# Patient Record
Sex: FEMALE | Race: WHITE | NOT HISPANIC OR LATINO | URBAN - METROPOLITAN AREA
[De-identification: names, ages, dates, MRNs, and addresses within clinical notes are randomized per-mention and may not be internally consistent; named-entity substitution may affect disease eponyms.]

---

## 2020-03-19 ENCOUNTER — EMERGENCY (EMERGENCY)
Facility: HOSPITAL | Age: 65
LOS: 1 days | Discharge: HOME | End: 2020-03-19
Attending: STUDENT IN AN ORGANIZED HEALTH CARE EDUCATION/TRAINING PROGRAM
Payer: COMMERCIAL

## 2020-03-19 VITALS
HEART RATE: 105 BPM | SYSTOLIC BLOOD PRESSURE: 152 MMHG | TEMPERATURE: 99 F | WEIGHT: 184.97 LBS | DIASTOLIC BLOOD PRESSURE: 85 MMHG | HEIGHT: 64 IN | OXYGEN SATURATION: 98 %

## 2020-03-19 VITALS
TEMPERATURE: 99 F | DIASTOLIC BLOOD PRESSURE: 78 MMHG | RESPIRATION RATE: 18 BRPM | HEART RATE: 82 BPM | SYSTOLIC BLOOD PRESSURE: 131 MMHG | OXYGEN SATURATION: 98 %

## 2020-03-19 LAB
ALBUMIN SERPL ELPH-MCNC: 3.7 G/DL — SIGNIFICANT CHANGE UP (ref 3.3–5)
ALP SERPL-CCNC: 105 U/L — SIGNIFICANT CHANGE UP (ref 40–120)
ALT FLD-CCNC: 72 U/L — HIGH (ref 10–45)
ANION GAP SERPL CALC-SCNC: 15 MMOL/L — SIGNIFICANT CHANGE UP (ref 5–17)
AST SERPL-CCNC: 48 U/L — HIGH (ref 10–40)
BASOPHILS # BLD AUTO: 0 K/UL — SIGNIFICANT CHANGE UP (ref 0–0.2)
BASOPHILS NFR BLD AUTO: 0 % — SIGNIFICANT CHANGE UP (ref 0–2)
BILIRUB SERPL-MCNC: 0.3 MG/DL — SIGNIFICANT CHANGE UP (ref 0.2–1.2)
BUN SERPL-MCNC: 12 MG/DL — SIGNIFICANT CHANGE UP (ref 7–23)
CALCIUM SERPL-MCNC: 9.2 MG/DL — SIGNIFICANT CHANGE UP (ref 8.4–10.5)
CHLORIDE SERPL-SCNC: 105 MMOL/L — SIGNIFICANT CHANGE UP (ref 96–108)
CO2 SERPL-SCNC: 22 MMOL/L — SIGNIFICANT CHANGE UP (ref 22–31)
CREAT SERPL-MCNC: 0.57 MG/DL — SIGNIFICANT CHANGE UP (ref 0.5–1.3)
EOSINOPHIL # BLD AUTO: 0.04 K/UL — SIGNIFICANT CHANGE UP (ref 0–0.5)
EOSINOPHIL NFR BLD AUTO: 0.9 % — SIGNIFICANT CHANGE UP (ref 0–6)
GLUCOSE SERPL-MCNC: 108 MG/DL — HIGH (ref 70–99)
HCT VFR BLD CALC: 35.2 % — SIGNIFICANT CHANGE UP (ref 34.5–45)
HGB BLD-MCNC: 10.9 G/DL — LOW (ref 11.5–15.5)
LYMPHOCYTES # BLD AUTO: 0.29 K/UL — LOW (ref 1–3.3)
LYMPHOCYTES # BLD AUTO: 6.9 % — LOW (ref 13–44)
MCHC RBC-ENTMCNC: 29.4 PG — SIGNIFICANT CHANGE UP (ref 27–34)
MCHC RBC-ENTMCNC: 31 GM/DL — LOW (ref 32–36)
MCV RBC AUTO: 94.9 FL — SIGNIFICANT CHANGE UP (ref 80–100)
MONOCYTES # BLD AUTO: 0.29 K/UL — SIGNIFICANT CHANGE UP (ref 0–0.9)
MONOCYTES NFR BLD AUTO: 7 % — SIGNIFICANT CHANGE UP (ref 2–14)
NEUTROPHILS # BLD AUTO: 3.58 K/UL — SIGNIFICANT CHANGE UP (ref 1.8–7.4)
NEUTROPHILS NFR BLD AUTO: 84.3 % — HIGH (ref 43–77)
NT-PROBNP SERPL-SCNC: 104 PG/ML — SIGNIFICANT CHANGE UP (ref 0–300)
PLATELET # BLD AUTO: 208 K/UL — SIGNIFICANT CHANGE UP (ref 150–400)
POTASSIUM SERPL-MCNC: 4.2 MMOL/L — SIGNIFICANT CHANGE UP (ref 3.5–5.3)
POTASSIUM SERPL-SCNC: 4.2 MMOL/L — SIGNIFICANT CHANGE UP (ref 3.5–5.3)
PROT SERPL-MCNC: 7.3 G/DL — SIGNIFICANT CHANGE UP (ref 6–8.3)
RBC # BLD: 3.71 M/UL — LOW (ref 3.8–5.2)
RBC # FLD: 13 % — SIGNIFICANT CHANGE UP (ref 10.3–14.5)
SODIUM SERPL-SCNC: 142 MMOL/L — SIGNIFICANT CHANGE UP (ref 135–145)
TROPONIN T, HIGH SENSITIVITY RESULT: <6 NG/L — SIGNIFICANT CHANGE UP (ref 0–51)
WBC # BLD: 4.2 K/UL — SIGNIFICANT CHANGE UP (ref 3.8–10.5)
WBC # FLD AUTO: 4.2 K/UL — SIGNIFICANT CHANGE UP (ref 3.8–10.5)

## 2020-03-19 PROCEDURE — 71045 X-RAY EXAM CHEST 1 VIEW: CPT | Mod: 26

## 2020-03-19 PROCEDURE — 93005 ELECTROCARDIOGRAM TRACING: CPT

## 2020-03-19 PROCEDURE — 71045 X-RAY EXAM CHEST 1 VIEW: CPT

## 2020-03-19 PROCEDURE — 84484 ASSAY OF TROPONIN QUANT: CPT

## 2020-03-19 PROCEDURE — 83880 ASSAY OF NATRIURETIC PEPTIDE: CPT

## 2020-03-19 PROCEDURE — 93010 ELECTROCARDIOGRAM REPORT: CPT

## 2020-03-19 PROCEDURE — 80053 COMPREHEN METABOLIC PANEL: CPT

## 2020-03-19 PROCEDURE — 99284 EMERGENCY DEPT VISIT MOD MDM: CPT | Mod: 25

## 2020-03-19 PROCEDURE — 99285 EMERGENCY DEPT VISIT HI MDM: CPT

## 2020-03-19 PROCEDURE — 85027 COMPLETE CBC AUTOMATED: CPT

## 2020-03-19 RX ORDER — ACETAMINOPHEN 500 MG
650 TABLET ORAL ONCE
Refills: 0 | Status: COMPLETED | OUTPATIENT
Start: 2020-03-19 | End: 2020-03-19

## 2020-03-19 RX ADMIN — Medication 650 MILLIGRAM(S): at 14:10

## 2020-03-19 RX ADMIN — Medication 650 MILLIGRAM(S): at 13:39

## 2020-03-19 NOTE — ED PROVIDER NOTE - OBJECTIVE STATEMENT
64F w/ HTN p/w SOB w/ exertion. States she was diagnosed w/ flu w/o a swab at urgent care and started on Tamiflu. Went there for a fever and cough. Reports having diarrhea. States she has been fever free for two days.  Denies chest pain, sick contacts, recent travel, n/v/d, abdominal pain.

## 2020-03-19 NOTE — ED PROVIDER NOTE - CLINICAL SUMMARY MEDICAL DECISION MAKING FREE TEXT BOX
64F w/ s/p URI now w/ exertional SOB, will check trops, ekg, bnp, evaluate for viral myocarditis, patient was treated for flu however never had a swab, could be COVID

## 2020-03-19 NOTE — ED PROVIDER NOTE - NSFOLLOWUPINSTRUCTIONS_ED_ALL_ED_FT
YOU WERE SEEN IN THE ED FOR A LIKELY VIRAL ILLNESS. WE CANNOT PERFORM DEFINITIVE TESTING AT THIS TIME FOR THE NOVEL CORONAVIRUS. PLEASE READ THE INFORMATION PACKET PROVIDED TO YOU CAREFULLY.     YOU SHOULD SELF-QUARANTINE FOR 14 DAYS TO AVOID POTENTIAL SPREAD OF THE CORONAVIRUS.     YOU WILL BE CONTACTED BY [facility] OR YOUR LOCAL HEALTH DEPARTMENT AFTER YOUR DISCHARGE TO HELP GUIDE YOU IN YOUR QUARANTINE AND ADVISE YOU FURTHER.    Return to the ED for difficulty breathing.    You may over the counter acetaminophen (Tylenol) 650mg every 6 hours as needed for fever or pain. There is some concern in the medical community about using ibuprofen (Advil, Motrin) and other NSAIDs in people with COVID infections and until there is more research on this subject it may be best to avoid NSAIDs like ibuprofen at the moment unless you have an allergy to acetaminophen (Tylenol).  Do NOT exceed 3500mg acetaminophen in 24 hours.  Please do not take these medications if you do not have pain or fever or if you have any history of liver disease.    -------------    What is a coronavirus?  Coronaviruses are a large family of viruses that cause illnesses ranging from the common cold to more severe diseases such as Middle East Respiratory Syndrome (MERS) and Severe Acute Respiratory Syndrome (SARS).    What is Novel Coronavirus (COVID-19)?  The Centers for Disease Control and Prevention (CDC) is closely monitoring the outbreak caused by COVID-19. For the latest information about COVID-19, visit the CDC website at CDC.gov/Coronavirus    How are coronaviruses spread?  Coronaviruses can be transmitted from person to person, usually after close contact with an infected  person (for example, in a household, workplace, or healthcare setting), via droplets that become airborne after a cough or sneeze. These droplets can then infect a nearby person. Transmission can also occur by touching recently contaminated surfaces.    Is there a treatment for a COVID-19?  There is no specific treatment for disease caused by COVID-19. However, many of the symptoms can be treated based on the patient’s clinical condition. Supportive care for infected persons can be highly effective.    What are the symptoms of coronavirus infection?  It depends on the virus, but common signs include fever and/or respiratory symptoms such as cough and shortness of breath. In more severe cases, infection can cause pneumonia, severe acute respiratory syndrome, kidney failure and even death. Fortunately, most cases of COVID-19 have an illness no different than the influenza (flu), with a majority of these patients having mild symptoms and overall mortality which appears to be not much different than the flu.    What can I do to protect myself?  The best precautionary measures:  – washing your hands  – covering your cough  – disinfecting surfaces  – it is also advisable to avoid close contact with anyone showing symptoms of respiratory illness such as coughing and sneezing  – those with symptoms should wear a surgical mask when around others    What can I do to protect those around me?  If you have been identified as someone who may be infected with COVID-19, we recommend you follow the self-isolation procedures outlined on the following page to protect those around you and to limit the spread of this virus.    We recommend the below precautionary steps from now until 14 days from when you returned from your travel or date of your last known possible contact:    — Do not go to work, school or public areas. Avoid using public transportation, ridesharing or taxis.  — As much as possible, separate yourself from other people in your home. If you can, you should stay in a room and away from other people. Also, you should use a separate bathroom if available.  — Wear the supplied mask whenever you are around other people.  — If you have a non-urgent medical appointment, please reschedule for a later date. If the appointment is urgent, please call the health care provider and tell them that you are on self-isolation for possible COVID-19. This will help the health care provider’s office take steps to keep other people from getting infected or exposed. If you can reschedule routine appointments, do so.  — Wash your hands often with soap and water for at least 15 to 20 seconds or clean your hands with an alcohol-based hand  that contains 60 to 95% alcohol, covering all surfaces of your hands and rubbing them together until they feel dry. Soap and water should be used preferentially if hands are visibly dirty.  — Cover your mouth and nose with a tissue when you cough or sneeze. Throw used tissues in a lined trash can. Immediately wash your hands.  — Avoid touching your eyes, nose, and mouth with your hands.  — Avoid sharing personal household items. You should not share dishes, drinking glasses, cups, eating utensils, towels, or bedding with other people or pets in your home. After using these items, they should be washed thoroughly with soap and water.  — Clean and disinfect all “high-touch” surfaces every day. High touch surfaces include counters, tabletops, doorknobs, light switches, remote controls, bathroom fixtures, toilets, phones, keyboards, tablets, and bedside tables. Also, clean any surfaces that may have blood, stool, or body fluids on them.

## 2020-03-19 NOTE — ED PROVIDER NOTE - PATIENT PORTAL LINK FT
You can access the FollowMyHealth Patient Portal offered by [facility] by registering at the following website: [email or web address]. By joining [employer]’s FollowMyHealth portal, you will also be able to view your health information using other applications (apps) compatible with our system.

## 2020-03-19 NOTE — ED ADULT NURSE NOTE - OBJECTIVE STATEMENT
63 y/o female c/o SOB with exertion.   Pt reports fever and cough so she went to Urgent Care, she was diagnosed with the flu and was started on Tamiflu.  eports having diarrhea. States she has been fever free for two days.  Denies chest pain, sick contacts, recent travel, n/v/d, abdominal pain. 65 y/o female c/o SOB with exertion.   Pt reports fever and cough so she went to Urgent Care, she was diagnosed with the flu and was started on Tamiflu.  Pt also reports having diarrhea and fever for 2-3 days.  Denies chest pain, sick contacts, recent travel, n/v/d, abdominal pain.  No acute respiratory distress noted.  Extremities mobile.

## 2020-03-19 NOTE — ED PROVIDER NOTE - ATTENDING CONTRIBUTION TO CARE
I performed a history and physical exam of the patient and discussed their management with the resident.  I reviewed the resident's note and agree with the documented findings and plan of care except as noted below. My medical decision making and observations are as follows:    64F pmh HTN p/w SOB w/ exertion. States she was diagnosed w/ flu w/o a swab at urgent care and started on Tamiflu. Went there for a fever and cough. Reports having diarrhea. States she has been fever free for two days.  now having worse shortness of breath on exertion.  Denies chest pain, abd pain.  Pt well appearing, heart rrr, lungs cta, abd soft ntnd, no peripheral edema.  Will check labs, trop, pro bnp, cxr, give tylenol and reassess.  patient will likely be appropriate for discharge as she is not hypoxic and comfortable appearing.

## 2020-03-19 NOTE — ED PROVIDER NOTE - PROGRESS NOTE DETAILS
Lam: had extensive conversation with patient about monitor her respiratory status, instructed to return for any increased shortness of breath, and instructed to quarantine for two weeks Attending MD Robin: received in sign out. CXR reviewed, no obvious infiltrates but perhaps slight patchiness in right lower lung field. Clinical suspicion for possible COVID-19, however in accordance with current [facility] Management Guidelines for likely treat and release patients, patient is not a candidate for urgent COVID-19 PCR testing at this time in the ED. Patient advised to observe self-isolation until further guidance from local [facility] or [facility]. Close return precautions for increasing dyspnea advised for patient. Lam: had extensive conversation with patient about monitor her respiratory status, instructed to return for any increased shortness of breath, and instructed to quarantine for two weeks  Patient is not a [employer] Employee and appropirate PPE was used

## 2020-03-19 NOTE — ED PROVIDER NOTE - NS ED ROS FT
General: denies fever, chills  HENT: denies nasal congestion, rhinorrhea  CV: denies chest pain, palpitations  Resp: + difficulty breathing, + cough  Abdominal: denies nausea, vomiting, abdominal pain  MSK: denies muscle aches, leg swelling  Neuro: denies headaches, numbness, tingling  Skin: denies rashes, bruises

## 2020-08-24 PROBLEM — I10 ESSENTIAL (PRIMARY) HYPERTENSION: Chronic | Status: ACTIVE | Noted: 2020-03-19

## 2020-08-25 ENCOUNTER — APPOINTMENT (OUTPATIENT)
Dept: ORTHOPEDIC SURGERY | Facility: CLINIC | Age: 65
End: 2020-08-25
Payer: COMMERCIAL

## 2020-08-25 VITALS
WEIGHT: 180 LBS | HEART RATE: 85 BPM | TEMPERATURE: 98.6 F | BODY MASS INDEX: 30.73 KG/M2 | TEMPERATURE: 98.6 F | DIASTOLIC BLOOD PRESSURE: 76 MMHG | SYSTOLIC BLOOD PRESSURE: 118 MMHG | HEIGHT: 64 IN

## 2020-08-25 PROCEDURE — 99204 OFFICE O/P NEW MOD 45 MIN: CPT

## 2020-08-25 NOTE — DISCUSSION/SUMMARY
[de-identified] : Of osteoarthritis in her right hip.  She does not take anti-inflammatories because she has a history of elevated liver function test she will speak to the her doctors who are following this and ask whether they feel there is any anti-inflammatory agent which would be safe for her to take.  At this time she will follow conservative measures.  In the future she may be a good candidate for right total hip replacement.  Return visit in 3 to 6 months.

## 2020-08-25 NOTE — PHYSICAL EXAM
[de-identified] : Constitutional - the patient is of normal build and nutrition.  The patient remains oriented to person, time, and  place.  Mood is normal. Vital signs as recorded.  The patients gait is small limp off her right right hip.. The patient has satisfactory  balance and can stand on toes and heels.\par \par The patient has no difficulty with respiration. Respiration at rest is a normal rate. The patient is not short of breath and has not become short of breath with short ambulation. There is no audible wheezing. No coughing.\par \par Skin is normal for the patient's age. There are no abnormal masses or lymph nodes which stand out in the lower extremities.\par \par Spine - deep tendon reflexes are symmetric\par \par \par UPPER EXTREMITIES \par \par Shoulders ROM  is symmetric  and the motion is satisfactory.  There is no significant shoulder pain or limitation in motion which would make using a cane or a walker difficult. Shoulder stability and  strength are satisfactory.\par \par Circulation appears satisfactory with pedal pulses present.  There is no major edema in the lower legs. No skin tenderness or increased temperature. No major varicosities.\par \par HIP EXAMINATION the abduction and abduction as well as rotation measurements were taken with the hip in flexion.\par \par Motion\par Hip motion shows flexion right hip 130 left hip 135, abduction right hip 60 left hip 80, adduction right hip 20 left hip 35, external rotation right hip 50 left hip 80, internal rotation right hip 0 left hip 20.  She has discomfort with motion in her right hip and when she ambulates on her hip but has discomfort in her right groin.\par \par There is good strength across the hips. There is no crepitus in either hip. The alignment of the hips are normal.\par \par \par KNEE EXAMINATION\par \par Motion\par Right Knee has 0 to 135 degrees of motion with good medial  lateral and anterior posterior stability.  There is no major effusion.  There is no Baker's cyst.  There is no significant patellofemoral crepitus.  The patient has satisfactory strength across the knee.               \par Left  Knee   has 0 to 135 degrees of motion with good medial lateral and anterior posterior stability.  There is no major effusion there is no Baker's cyst.  There is no significant patellofemoral crepitus.  The patient has satisfactory strength across the knee.            \par \par Ankle and foot examination\par Of the ankle has normal motion.  There is normal ankle stability.  The patient has no major abnormalities of the foot.\par \par \par \par  [de-identified] : Strays from elsewhere are reviewed she has a medial type arthritis early osteophytes at the head neck junction superiorly of there there is still some joint space maintained and femoral head is round.  Impression osteoarthritis right hip.

## 2020-08-25 NOTE — HISTORY OF PRESENT ILLNESS
[de-identified] : Ms. MP ALFARO is a 65 year female who presents to office complaining of right hip pain.\par She has felt a pain described as a burning/cramping pain in her right groin x 4-6 weeks with insidious onset.\par She says the pain does not radiate or result in numbness/tingling.\par Pain is worsened with getting up from a seated position and going up and down stairs.\par Patient has tried Turmeric for the pain, which helps a little bit. She has not tried PT or had any injections in the hip.\par Patient initially saw another orthopedist Dr. Jaydon Montoya who recommended hip replacement surgery. Patient is here today for a 2nd opinion on this.\par All review of systems, family history, social history, surgical history, past medical history, medications, and allergies not previously stated as positive are negative. They were reviewed by me today with the patient and documented accordingly.

## 2021-02-16 ENCOUNTER — APPOINTMENT (OUTPATIENT)
Dept: ORTHOPEDIC SURGERY | Facility: CLINIC | Age: 66
End: 2021-02-16
Payer: COMMERCIAL

## 2021-02-16 VITALS — HEIGHT: 64 IN | BODY MASS INDEX: 30.73 KG/M2 | WEIGHT: 180 LBS | TEMPERATURE: 97.2 F

## 2021-02-16 PROCEDURE — 99072 ADDL SUPL MATRL&STAF TM PHE: CPT

## 2021-02-16 PROCEDURE — 99213 OFFICE O/P EST LOW 20 MIN: CPT | Mod: 95

## 2021-02-16 PROCEDURE — 73522 X-RAY EXAM HIPS BI 3-4 VIEWS: CPT

## 2021-02-16 NOTE — PHYSICAL EXAM
[de-identified] : X-rays were taken in our office today.  At these show there may be some subchondral increased sclerosis in the left hip of the left hip femoral head is round and joint spaces are maintained.  Her right hip does appear today to have some decrease in the joint space superiorly and early peripheral osteophytes around the head neck junction consistent with arthritis.  \par \par There does not appear to be a major change from the x-rays that she brought can previously however those are not here at this time. [de-identified] : \par HIP EXAMINATION the abduction and abduction as well as rotation measurements were taken with the hip in flexion.\par \par Motion\par Her hip motion stays the same as it was in August.  She has some decreased motion in her right hip but she is managing with it.  Hip motion shows flexion right hip 130 left hip 135, abduction right hip 60 left hip 80, adduction right hip 20 left hip 35, external rotation right hip 50 left hip 80, internal rotation right hip 0 left hip 20.  She has some discomfort with motion in her right hip and when she ambulates.\par \par There is good strength across the hips. There is no crepitus in either hip. The alignment of the hips are normal.\par \par \par KNEE EXAMINATION\par \par Motion\par Right Knee has 0 to 135 degrees of motion with good medial  lateral and anterior posterior stability.  There is no major effusion.  There is no Baker's cyst.  There is no significant patellofemoral crepitus.  The patient has satisfactory strength across the knee.               \par Left  Knee   has 0 to 135 degrees of motion with good medial lateral and anterior posterior stability.  There is no major effusion there is no Baker's cyst.  There is no significant patellofemoral crepitus.  The patient has satisfactory strength across the knee.            \par \par Ankle and foot examination\par Of the ankle has normal motion.  There is normal ankle stability.  The patient has no major abnormalities of the foot.\par \par \par \par

## 2021-02-16 NOTE — DISCUSSION/SUMMARY
[de-identified] : This patient does have osteoarthritis of the right hip but she is managing very well.  She does not feel that it is getting worse and she is doing her exercises and has finished physical therapy.  We will follow her conservatively at this time she will return in 6 months for follow-up.

## 2021-02-16 NOTE — HISTORY OF PRESENT ILLNESS
[Other Location: e.g. School (Enter Location, City,State)___] : at [unfilled], at the time of the visit. [Other Location: e.g. Home (Enter Location, City,State)___] : at [unfilled] [Verbal consent obtained from patient] : the patient, [unfilled] [de-identified] : Ms. MP ALFARO is a 65 year female who returns to office complaining of right hip pain.\par She has felt a pain described as a burning/cramping pain in her right groin x 6-7 months with insidious onset.\par She says the pain does not radiate or result in numbness/tingling.\par Pain is worsened with getting up from a seated position and going up and down stairs. However, this is made easier by the fact that she has done PT 1x/week.\par Patient has tried Turmeric for the pain, which helps a little bit. She has not tried PT or had any injections in the hip.\par Patient initially saw another orthopedist Dr. Jaydon Montoya who recommended hip replacement surgery.\par She cannot take NSAIDs due to elevated LFTs.\par All review of systems, family history, social history, surgical history, past medical history, medications, and allergies not previously stated as positive are negative. They were reviewed by me today with the patient and documented accordingly.

## 2021-06-01 ENCOUNTER — APPOINTMENT (OUTPATIENT)
Dept: HEPATOLOGY | Facility: CLINIC | Age: 66
End: 2021-06-01
Payer: COMMERCIAL

## 2021-06-01 VITALS
OXYGEN SATURATION: 100 % | BODY MASS INDEX: 30.73 KG/M2 | DIASTOLIC BLOOD PRESSURE: 86 MMHG | HEIGHT: 64 IN | WEIGHT: 180 LBS | HEART RATE: 80 BPM | SYSTOLIC BLOOD PRESSURE: 137 MMHG | RESPIRATION RATE: 12 BRPM | TEMPERATURE: 97.3 F

## 2021-06-01 PROCEDURE — 99204 OFFICE O/P NEW MOD 45 MIN: CPT

## 2021-06-01 NOTE — PHYSICAL EXAM
[Scleral Icterus] : No Scleral Icterus [Abdominal  Ascites] : no ascites [Asterixis] : no asterixis observed [Jaundice] : No jaundice [Depression] : no depression [General Appearance - Alert] : alert [General Appearance - In No Acute Distress] : in no acute distress [Neck Appearance] : the appearance of the neck was normal [Neck Cervical Mass (___cm)] : no neck mass was observed [Jugular Venous Distention Increased] : there was no jugular-venous distention [Thyroid Diffuse Enlargement] : the thyroid was not enlarged [Thyroid Nodule] : there were no palpable thyroid nodules [Auscultation Breath Sounds / Voice Sounds] : lungs were clear to auscultation bilaterally [Heart Rate And Rhythm] : heart rate was normal and rhythm regular [Heart Sounds] : normal S1 and S2 [Heart Sounds Gallop] : no gallops [Murmurs] : no murmurs [Heart Sounds Pericardial Friction Rub] : no pericardial rub [Bowel Sounds] : normal bowel sounds [Abdomen Soft] : soft [Abdomen Tenderness] : non-tender [Abdomen Mass (___ Cm)] : no abdominal mass palpated [Abnormal Walk] : normal gait [Nail Clubbing] : no clubbing  or cyanosis of the fingernails [Musculoskeletal - Swelling] : no joint swelling seen [Motor Tone] : muscle strength and tone were normal [Skin Color & Pigmentation] : normal skin color and pigmentation [Skin Turgor] : normal skin turgor [] : no rash [Oriented To Time, Place, And Person] : oriented to person, place, and time [Impaired Insight] : insight and judgment were intact [Affect] : the affect was normal

## 2021-06-01 NOTE — HISTORY OF PRESENT ILLNESS
[de-identified] : 64 y/o F w/ hx of HTN, HLD, obesity here for elevated liver enzymes.\par \par Initially referred to us years ago for a history of abnormal liver tests. She has been evaluated for abnormal liver tests in 2009 with a nondiagnostic workup. A liver biopsy performed in 2009 revealed normal architecture with mild congestion. She had blood tests done on February 21, 2013 which revealed an ALT of 83 and an AST of 55. She reports being on intermittent antibiotics for urinary tract infection from September through December. She is feeling well without any complaints.\par Her repeat labs from 6/28/2013 show an ALT 19, AST 20\par 3/2021 BW - AST 64, , . IgG 1796., IgA 90, IgM 83.\par 4/27/21 BW - , AST 34, ALT 45\par 6/1/21 visit - taking losartan 50 mg daily, HCTZ 12.5 mg daily. no herbal or dietary supplements. No alcohol. 13 lbs of weight gain since 2013 (last seen)

## 2021-06-01 NOTE — CONSULT LETTER
[Dear  ___] : Dear  [unfilled], [Consult Letter:] : I had the pleasure of evaluating your patient, [unfilled]. [( Thank you for referring [unfilled] for consultation for _____ )] : Thank you for referring [unfilled] for consultation for [unfilled] [Please see my note below.] : Please see my note below. [Consult Closing:] : Thank you very much for allowing me to participate in the care of this patient.  If you have any questions, please do not hesitate to contact me. [Sincerely,] : Sincerely, [FreeTextEntry2] : Jesica Jere [FreeTextEntry3] : Dr. Bereket Milan MD\par  of Medicine\par [facility]\par [facility]\par

## 2021-06-01 NOTE — ASSESSMENT
[FreeTextEntry1] : 64 y/o F w/ hx of HTN, HLD, obesity here for elevated liver enzymes.\par \par # Elevated liver enzymes, suspect CASTANON. Previously diagnosed with DILI back in 2013 but off NSAIDs now for year\par Repeat routine liver w/u\par Elevated IgG, consider AIH\par Ultrasound abdomen\par Fibroscan at next visit\par RTC 1 month\par \par # RHM\par needs repeat colonoscopy in 2024

## 2021-06-02 LAB
ALBUMIN SERPL ELPH-MCNC: 4.6 G/DL
ALP BLD-CCNC: 90 U/L
ALT SERPL-CCNC: 45 U/L
ANA SER IF-ACNC: NEGATIVE
ANION GAP SERPL CALC-SCNC: 13 MMOL/L
AST SERPL-CCNC: 38 U/L
BASOPHILS # BLD AUTO: 0.06 K/UL
BASOPHILS NFR BLD AUTO: 1.1 %
BILIRUB SERPL-MCNC: 0.3 MG/DL
BUN SERPL-MCNC: 12 MG/DL
CALCIUM SERPL-MCNC: 9.7 MG/DL
CHLORIDE SERPL-SCNC: 100 MMOL/L
CO2 SERPL-SCNC: 25 MMOL/L
CREAT SERPL-MCNC: 0.64 MG/DL
EOSINOPHIL # BLD AUTO: 0.03 K/UL
EOSINOPHIL NFR BLD AUTO: 0.5 %
ESTIMATED AVERAGE GLUCOSE: 111 MG/DL
FERRITIN SERPL-MCNC: 63 NG/ML
GGT SERPL-CCNC: 50 U/L
GLUCOSE SERPL-MCNC: 82 MG/DL
HBA1C MFR BLD HPLC: 5.5 %
HBV CORE IGG+IGM SER QL: NONREACTIVE
HBV SURFACE AB SER QL: NONREACTIVE
HBV SURFACE AG SER QL: NONREACTIVE
HCT VFR BLD CALC: 38 %
HCV AB SER QL: NONREACTIVE
HCV S/CO RATIO: 0.09 S/CO
HEPATITIS A IGG ANTIBODY: NONREACTIVE
HGB BLD-MCNC: 12.3 G/DL
IMM GRANULOCYTES NFR BLD AUTO: 0.4 %
INR PPP: 0.96 RATIO
IRON SATN MFR SERPL: 17 %
IRON SERPL-MCNC: 49 UG/DL
LKM AB SER QL IF: <20.1 UNITS
LYMPHOCYTES # BLD AUTO: 1.49 K/UL
LYMPHOCYTES NFR BLD AUTO: 26.4 %
MAN DIFF?: NORMAL
MCHC RBC-ENTMCNC: 31.1 PG
MCHC RBC-ENTMCNC: 32.4 GM/DL
MCV RBC AUTO: 96.2 FL
MITOCHONDRIA AB SER IF-ACNC: NORMAL
MONOCYTES # BLD AUTO: 0.38 K/UL
MONOCYTES NFR BLD AUTO: 6.7 %
NEUTROPHILS # BLD AUTO: 3.66 K/UL
NEUTROPHILS NFR BLD AUTO: 64.9 %
PLATELET # BLD AUTO: 179 K/UL
POTASSIUM SERPL-SCNC: 4.2 MMOL/L
PROT SERPL-MCNC: 7.6 G/DL
PT BLD: 11.3 SEC
RBC # BLD: 3.95 M/UL
RBC # FLD: 13.8 %
SMOOTH MUSCLE AB SER QL IF: NORMAL
SODIUM SERPL-SCNC: 138 MMOL/L
TIBC SERPL-MCNC: 285 UG/DL
TSH SERPL-ACNC: 2.67 UIU/ML
UIBC SERPL-MCNC: 237 UG/DL
WBC # FLD AUTO: 5.64 K/UL

## 2021-06-03 ENCOUNTER — APPOINTMENT (OUTPATIENT)
Dept: ULTRASOUND IMAGING | Facility: IMAGING CENTER | Age: 66
End: 2021-06-03

## 2021-06-03 LAB
DEPRECATED KAPPA LC FREE/LAMBDA SER: 1.08 RATIO
IGA SER QL IEP: 90 MG/DL
IGG SER QL IEP: 1949 MG/DL
IGM SER QL IEP: 90 MG/DL
KAPPA LC CSF-MCNC: 1.24 MG/DL
KAPPA LC SERPL-MCNC: 1.34 MG/DL

## 2021-06-09 ENCOUNTER — APPOINTMENT (OUTPATIENT)
Dept: ULTRASOUND IMAGING | Facility: CLINIC | Age: 66
End: 2021-06-09
Payer: COMMERCIAL

## 2021-06-09 ENCOUNTER — TRANSCRIPTION ENCOUNTER (OUTPATIENT)
Age: 66
End: 2021-06-09

## 2021-06-09 ENCOUNTER — OUTPATIENT (OUTPATIENT)
Dept: OUTPATIENT SERVICES | Facility: HOSPITAL | Age: 66
LOS: 1 days | End: 2021-06-09
Payer: COMMERCIAL

## 2021-06-09 DIAGNOSIS — R94.5 ABNORMAL RESULTS OF LIVER FUNCTION STUDIES: ICD-10-CM

## 2021-06-09 PROCEDURE — 76700 US EXAM ABDOM COMPLETE: CPT | Mod: 26

## 2021-06-09 PROCEDURE — 76700 US EXAM ABDOM COMPLETE: CPT

## 2021-06-30 ENCOUNTER — NON-APPOINTMENT (OUTPATIENT)
Age: 66
End: 2021-06-30

## 2021-07-09 ENCOUNTER — APPOINTMENT (OUTPATIENT)
Dept: HEPATOLOGY | Facility: CLINIC | Age: 66
End: 2021-07-09
Payer: COMMERCIAL

## 2021-07-09 VITALS
OXYGEN SATURATION: 100 % | WEIGHT: 185 LBS | HEIGHT: 64 IN | TEMPERATURE: 97 F | HEART RATE: 80 BPM | BODY MASS INDEX: 31.58 KG/M2 | DIASTOLIC BLOOD PRESSURE: 41 MMHG | SYSTOLIC BLOOD PRESSURE: 130 MMHG | RESPIRATION RATE: 12 BRPM

## 2021-07-09 PROCEDURE — 99213 OFFICE O/P EST LOW 20 MIN: CPT

## 2021-07-09 PROCEDURE — 91200 LIVER ELASTOGRAPHY: CPT

## 2021-07-09 NOTE — PHYSICAL EXAM
[General Appearance - Alert] : alert [General Appearance - In No Acute Distress] : in no acute distress [Neck Appearance] : the appearance of the neck was normal [Neck Cervical Mass (___cm)] : no neck mass was observed [Jugular Venous Distention Increased] : there was no jugular-venous distention [Thyroid Diffuse Enlargement] : the thyroid was not enlarged [Thyroid Nodule] : there were no palpable thyroid nodules [Auscultation Breath Sounds / Voice Sounds] : lungs were clear to auscultation bilaterally [Heart Rate And Rhythm] : heart rate was normal and rhythm regular [Heart Sounds] : normal S1 and S2 [Heart Sounds Gallop] : no gallops [Murmurs] : no murmurs [Heart Sounds Pericardial Friction Rub] : no pericardial rub [Bowel Sounds] : normal bowel sounds [Abdomen Soft] : soft [Abdomen Tenderness] : non-tender [Abdomen Mass (___ Cm)] : no abdominal mass palpated [Abnormal Walk] : normal gait [Nail Clubbing] : no clubbing  or cyanosis of the fingernails [Musculoskeletal - Swelling] : no joint swelling seen [Motor Tone] : muscle strength and tone were normal [Skin Color & Pigmentation] : normal skin color and pigmentation [Skin Turgor] : normal skin turgor [] : no rash [Oriented To Time, Place, And Person] : oriented to person, place, and time [Impaired Insight] : insight and judgment were intact [Affect] : the affect was normal [Scleral Icterus] : No Scleral Icterus [Abdominal  Ascites] : no ascites [Asterixis] : no asterixis observed [Jaundice] : No jaundice [Depression] : no depression

## 2021-07-09 NOTE — HISTORY OF PRESENT ILLNESS
[de-identified] : 66 y/o F w/ hx of HTN, HLD, obesity here for elevated liver enzymes.\par \par Initially referred to us years ago for a history of abnormal liver tests. She has been evaluated for abnormal liver tests in 2009 with a nondiagnostic workup. A liver biopsy performed in 2009 revealed normal architecture with mild congestion. She had blood tests done on February 21, 2013 which revealed an ALT of 83 and an AST of 55. She reports being on intermittent antibiotics for urinary tract infection from September through December. She is feeling well without any complaints.\par Her repeat labs from 6/28/2013 show an ALT 19, AST 20\par 3/2021 BW - AST 64, , . IgG 1796., IgA 90, IgM 83.\par 4/27/21 BW - , AST 34, ALT 45\par 6/1/21 visit - taking losartan 50 mg daily, HCTZ 12.5 mg daily. no herbal or dietary supplements. No alcohol. 13 lbs of weight gain since 2013 (last seen) BW - AST 38, ALT 45\par 7/7/21 visit - Fibroscan , 3.9 kPa. C/w S0, F0.

## 2021-07-09 NOTE — CONSULT LETTER
[Consult Letter:] : I had the pleasure of evaluating your patient, [unfilled]. [( Thank you for referring [unfilled] for consultation for _____ )] : Thank you for referring [unfilled] for consultation for [unfilled] [Please see my note below.] : Please see my note below. [Consult Closing:] : Thank you very much for allowing me to participate in the care of this patient.  If you have any questions, please do not hesitate to contact me. [Sincerely,] : Sincerely, [Dear  ___] : Dear  [unfilled], [FreeTextEntry2] : Evelyn Craig\par Benjamin Mckeon - Fax [phone] [FreeTextEntry3] : Dr. Bereket Milan MD\par  of Medicine\par [facility]\par [facility] / [facility]\par

## 2021-07-09 NOTE — ASSESSMENT
[FreeTextEntry1] : 66 y/o F w/ hx of HTN, HLD, obesity here for elevated liver enzymes that appears to be fluctuating and self limiting. Previously thought it was due to drug induced liver injury back in 2013 from NSAIDS. Fibroscan from 7/7/21 now does not show any scarring or fat in the liver. Work-up unremarkable other than an elevated IgG level consistent with monoclonal gammopathy that is being monitored by Dr. Mckeon. Elevated liver enzymes of unclear cause but would not recommend any further invasive work-up for the time being given self improvement.\par \par # RHM\par needs repeat colonoscopy in 2024\par \par RTC 6 months.

## 2021-07-27 ENCOUNTER — APPOINTMENT (OUTPATIENT)
Dept: ORTHOPEDIC SURGERY | Facility: CLINIC | Age: 66
End: 2021-07-27
Payer: COMMERCIAL

## 2021-07-27 PROCEDURE — 99213 OFFICE O/P EST LOW 20 MIN: CPT

## 2021-07-27 NOTE — PHYSICAL EXAM
[de-identified] : \par \par HIP EXAMINATION the abduction and abduction as well as rotation measurements were taken with the hip in flexion.\par \par Motion\par This patient is managing but still with pain in her right hip her range of motion has not changed since her previous visit.  She does have pain with weightbearing on her hip and with any longer walks.  Hip motion shows flexion right hip 130 left hip 135, abduction right hip 60 left hip 80, adduction right hip 20 left hip 35, external rotation right hip 50 left hip 80, internal rotation right hip 0 left hip 20.  On follow-up examination she has not significantly changed since her last visit but does have the known arthritis.\par There is good strength across the hips. There is no crepitus in either hip. The alignment of the hips are normal.\par \par \par KNEE EXAMINATION\par \par Motion\par Right Knee has 0 to 135 degrees of motion with good medial  lateral and anterior posterior stability.  There is no major effusion.  There is no Baker's cyst.  There is no significant patellofemoral crepitus.  The patient has satisfactory strength across the knee.               \par Left  Knee   has 0 to 135 degrees of motion with good medial lateral and anterior posterior stability.  There is no major effusion there is no Baker's cyst.  There is no significant patellofemoral crepitus.  The patient has satisfactory strength across the knee.            \par \par Ankle and foot examination\par Of the ankle has normal motion.  There is normal ankle stability.  The patient has no major abnormalities of the foot.\par \par \par \par

## 2021-07-27 NOTE — HISTORY OF PRESENT ILLNESS
[de-identified] : Ms. MP ALFARO is a 65 year female who returns to office complaining of right hip pain.\par She has felt a pain described as a burning/cramping pain in her right groin x 1 year with insidious onset.\par She says the pain does not radiate or result in numbness/tingling.\par Pain is worsened with getting up from a seated position and going up and down stairs. However, this is made easier by the fact that she has done PT 1x/week.\par Patient has tried Turmeric for the pain, which helps a little bit. She has not tried any injections in the hip.\par Patient initially saw another orthopedist Dr. Jaydon Montoya who recommended hip replacement surgery.\par She cannot take NSAIDs due to elevated LFTs.

## 2021-07-27 NOTE — DISCUSSION/SUMMARY
[de-identified] : This patient does have osteoarthritis in her hip but cannot take anti-inflammatories because of a GI problem.  She cannot take a Tylenol and has been taking to [facility].  She says that these do help.  She realizes in the future she would consider total hip replacement but she feels she is managing at this time and will return in 6 months for follow-up.  \par \par

## 2022-02-22 ENCOUNTER — APPOINTMENT (OUTPATIENT)
Dept: ORTHOPEDIC SURGERY | Facility: CLINIC | Age: 67
End: 2022-02-22
Payer: COMMERCIAL

## 2022-02-22 VITALS
WEIGHT: 186 LBS | HEIGHT: 64 IN | DIASTOLIC BLOOD PRESSURE: 73 MMHG | SYSTOLIC BLOOD PRESSURE: 112 MMHG | BODY MASS INDEX: 31.76 KG/M2 | HEART RATE: 79 BPM

## 2022-02-22 PROCEDURE — 73522 X-RAY EXAM HIPS BI 3-4 VIEWS: CPT

## 2022-02-22 PROCEDURE — 99213 OFFICE O/P EST LOW 20 MIN: CPT

## 2022-02-22 NOTE — PHYSICAL EXAM
[de-identified] : \par \par HIP EXAMINATION the abduction and abduction as well as rotation measurements were taken with the hip in flexion.\par \par This patient's condition has not changed although she does have the stiffness in her right hip and has some discomfort when she first gets up she continues to manage.\par \par Motion\par This patient is managing but still with pain in her right hip her range of motion has not changed since her previous visit.  Posterior discomfort is when first getting up and starting to walk then she feels better.  Her hip motion has not changed.  Hip motion shows flexion right hip 130 left hip 135, abduction right hip 60 left hip 80, adduction right hip 20 left hip 35, external rotation right hip 50 left hip 80, internal rotation right hip 0 left hip 20.  On follow-up examination she has not significantly changed since her last visit but does have the known arthritis.\par There is good strength across the hips. There is no crepitus in either hip. The alignment of the hips are normal.\par \par \par KNEE EXAMINATION\par \par Both of her knees continue to have good range of motion good medial lateral and anterior posterior stability and no significant pain. [de-identified] : An AP of the pelvis and lateral of the right and left hip shows a left femoral head is round joint space maintained the right hip does show the degenerative arthritis the early osteophyte around the head neck junction and narrowing of the joint superiorly however there is no change from the x-ray taken today versus her previous x-rays in February 2021.

## 2022-02-22 NOTE — DISCUSSION/SUMMARY
[de-identified] : This patient says that she is managing she realized you does have osteoarthritis in her right hip would like to stay on conservative measures she will return in 6 months for follow-up she will come in sooner if he starts having increased eye discomfort.

## 2022-02-22 NOTE — HISTORY OF PRESENT ILLNESS
[de-identified] : Ms. MP ALFARO is a 66 year female who returns to office complaining of right hip pain.\par She has felt a pain described as a burning/cramping pain in her right groin x 2 years with insidious onset.\par She says the pain does not radiate or result in numbness/tingling.\par Pain is worsened with getting up from a seated position and going up and down stairs. However, this is made easier by the fact that she has done PT 1x/week.\par Patient has tried Turmeric for the pain, which helps a little bit. She has not tried any injections in the hip.\par Patient initially saw another orthopedist Dr. Jaydon Montoya who recommended hip replacement surgery.\par She cannot take NSAIDs due to elevated LFTs.

## 2022-03-07 ENCOUNTER — APPOINTMENT (OUTPATIENT)
Dept: HEPATOLOGY | Facility: CLINIC | Age: 67
End: 2022-03-07

## 2022-04-04 ENCOUNTER — APPOINTMENT (OUTPATIENT)
Dept: HEPATOLOGY | Facility: CLINIC | Age: 67
End: 2022-04-04
Payer: COMMERCIAL

## 2022-04-04 VITALS
BODY MASS INDEX: 31.58 KG/M2 | SYSTOLIC BLOOD PRESSURE: 135 MMHG | DIASTOLIC BLOOD PRESSURE: 83 MMHG | HEART RATE: 78 BPM | HEIGHT: 64 IN | TEMPERATURE: 98 F | WEIGHT: 185 LBS | RESPIRATION RATE: 15 BRPM | OXYGEN SATURATION: 97 %

## 2022-04-04 LAB
ALBUMIN SERPL ELPH-MCNC: 4.4 G/DL
ALP BLD-CCNC: 77 U/L
ALT SERPL-CCNC: 32 U/L
ANION GAP SERPL CALC-SCNC: 12 MMOL/L
AST SERPL-CCNC: 32 U/L
BASOPHILS # BLD AUTO: 0.06 K/UL
BASOPHILS NFR BLD AUTO: 1.1 %
BILIRUB SERPL-MCNC: 0.5 MG/DL
BUN SERPL-MCNC: 14 MG/DL
CALCIUM SERPL-MCNC: 9.5 MG/DL
CHLORIDE SERPL-SCNC: 105 MMOL/L
CO2 SERPL-SCNC: 24 MMOL/L
CREAT SERPL-MCNC: 0.63 MG/DL
EGFR: 99 ML/MIN/1.73M2
EOSINOPHIL # BLD AUTO: 0.03 K/UL
EOSINOPHIL NFR BLD AUTO: 0.6 %
GGT SERPL-CCNC: 32 U/L
GLUCOSE SERPL-MCNC: 94 MG/DL
HCT VFR BLD CALC: 38.9 %
HGB BLD-MCNC: 12.2 G/DL
IMM GRANULOCYTES NFR BLD AUTO: 0.2 %
INR PPP: 0.98 RATIO
LYMPHOCYTES # BLD AUTO: 1.14 K/UL
LYMPHOCYTES NFR BLD AUTO: 21.5 %
MAN DIFF?: NORMAL
MCHC RBC-ENTMCNC: 30.5 PG
MCHC RBC-ENTMCNC: 31.4 GM/DL
MCV RBC AUTO: 97.3 FL
MONOCYTES # BLD AUTO: 0.37 K/UL
MONOCYTES NFR BLD AUTO: 7 %
NEUTROPHILS # BLD AUTO: 3.69 K/UL
NEUTROPHILS NFR BLD AUTO: 69.6 %
PLATELET # BLD AUTO: 178 K/UL
POTASSIUM SERPL-SCNC: 4.2 MMOL/L
PROT SERPL-MCNC: 7.4 G/DL
PT BLD: 11.5 SEC
RBC # BLD: 4 M/UL
RBC # FLD: 13.3 %
SODIUM SERPL-SCNC: 142 MMOL/L
WBC # FLD AUTO: 5.3 K/UL

## 2022-04-04 PROCEDURE — 99213 OFFICE O/P EST LOW 20 MIN: CPT

## 2022-04-04 NOTE — ASSESSMENT
[FreeTextEntry1] : 67 y/o F w/ hx of HTN, HLD, obesity here for elevated liver enzymes that appears to be fluctuating and self limiting. \par \par Previously thought it was due to drug induced liver injury back in 2013 from NSAIDS. Fibroscan from 7/7/21 now does not show any scarring or fat in the liver. Work-up unremarkable other than an elevated IgG level consistent with monoclonal gammopathy that is being monitored by Dr. Mckeon. Elevated liver enzymes of unclear cause but would not recommend any further invasive work-up for the time being given self improvement.\par \par Repeat labs.\par \par # RHM\par needs repeat colonoscopy in 2024\par \par Repeat labs now. If elevated then will follow. If not, then patient can be followed by PCP.

## 2022-04-04 NOTE — HISTORY OF PRESENT ILLNESS
[de-identified] : 67 y/o F w/ hx of HTN, HLD, obesity here for elevated liver enzymes.\par \par Initially referred to us years ago for a history of abnormal liver tests. She has been evaluated for abnormal liver tests in 2009 with a nondiagnostic workup. A liver biopsy performed in 2009 revealed normal architecture with mild congestion. She had blood tests done on February 21, 2013 which revealed an ALT of 83 and an AST of 55. She reports being on intermittent antibiotics for urinary tract infection from September through December. She is feeling well without any complaints.\par Her repeat labs from 6/28/2013 show an ALT 19, AST 20\par 3/2021 BW - AST 64, , . IgG 1796., IgA 90, IgM 83.\par 4/27/21 BW - , AST 34, ALT 45\par 6/1/21 visit - taking losartan 50 mg daily, HCTZ 12.5 mg daily. no herbal or dietary supplements. No alcohol. 13 lbs of weight gain since 2013 (last seen) BW - AST 38, ALT 45\par 7/7/21 visit - Fibroscan , 3.9 kPa. C/w S0, F0.\par 4/4/22 visit - doing well. on rosuvastatin for the past 4 months. no recent labs available.

## 2022-04-04 NOTE — CONSULT LETTER
[Dear  ___] : Dear  [unfilled], [Consult Letter:] : I had the pleasure of evaluating your patient, [unfilled]. [( Thank you for referring [unfilled] for consultation for _____ )] : Thank you for referring [unfilled] for consultation for [unfilled] [Please see my note below.] : Please see my note below. [Consult Closing:] : Thank you very much for allowing me to participate in the care of this patient.  If you have any questions, please do not hesitate to contact me. [Sincerely,] : Sincerely, [FreeTextEntry2] : Evelyn Craig\par Benjamin Mckeon - Fax [phone] [FreeTextEntry3] : Dr. Bereket Milan MD\par  of Medicine\par [facility]\par [facility]\par

## 2022-04-07 ENCOUNTER — TRANSCRIPTION ENCOUNTER (OUTPATIENT)
Age: 67
End: 2022-04-07

## 2022-04-08 ENCOUNTER — TRANSCRIPTION ENCOUNTER (OUTPATIENT)
Age: 67
End: 2022-04-08

## 2022-08-10 ENCOUNTER — APPOINTMENT (OUTPATIENT)
Dept: ORTHOPEDIC SURGERY | Facility: CLINIC | Age: 67
End: 2022-08-10

## 2022-08-10 DIAGNOSIS — M16.11 UNILATERAL PRIMARY OSTEOARTHRITIS, RIGHT HIP: ICD-10-CM

## 2022-08-10 PROCEDURE — 99213 OFFICE O/P EST LOW 20 MIN: CPT

## 2022-08-10 NOTE — DISCUSSION/SUMMARY
[de-identified] : At this time the patient just wants to be followed she knows in the future she may consider total hip replacement but she is managing at this time and her motion has remained approximately the same with a slight loss of external rotation.  She will try Celebrex 200 mg a day as an anti-inflammatory and return in 3 to 6 months.

## 2022-08-10 NOTE — PHYSICAL EXAM
[de-identified] : \par \par HIP EXAMINATION the abduction and abduction as well as rotation measurements were taken with the hip in flexion.\par \par This patient's condition has not changed although she does have the stiffness in her right hip and has some discomfort when she first gets up she continues to manage.\par \par Motion\par This time the patient is managing about the same she still does have the groin pain in her knees she does have the discomfort especially when first getting up but she is still managing her physical examination has not significantly changed.  At this time she has flexion right hip 130 left hip 135, abduction right hip 60 left hip 80, adduction right hip 20 left hip 35, external rotation right hip 30 left hip 80, internal rotation right hip 0 left hip 20.  There is no crepitus in either hip. The alignment of the hips are normal.\par \par \par KNEE EXAMINATION\par \par Both of her knees continue to have good range of motion good medial lateral and anterior posterior stability and no significant pain.

## 2022-08-10 NOTE — HISTORY OF PRESENT ILLNESS
[de-identified] : Ms. MP ALFARO is a 66 year female who returns to office complaining of right hip pain.\par She has felt a pain described as a burning/cramping pain in her right groin x 2-3 years with insidious onset.\par She says the pain does not radiate or result in numbness/tingling.\par Pain is worsened with getting up from a seated position and going up and down stairs. However, this is made easier by the fact that she has done PT 1x/week.\par Patient has tried Turmeric for the pain, which helps a little bit. She has not tried any injections in the hip.\par Patient initially saw another orthopedist Dr. Jaydon Montoya who recommended hip replacement surgery.\par She cannot take NSAIDs due to elevated LFTs.

## 2022-12-16 ENCOUNTER — APPOINTMENT (OUTPATIENT)
Dept: HEPATOLOGY | Facility: CLINIC | Age: 67
End: 2022-12-16

## 2022-12-16 VITALS
TEMPERATURE: 97.5 F | SYSTOLIC BLOOD PRESSURE: 110 MMHG | DIASTOLIC BLOOD PRESSURE: 60 MMHG | HEART RATE: 87 BPM | BODY MASS INDEX: 31.92 KG/M2 | HEIGHT: 64 IN | WEIGHT: 187 LBS | OXYGEN SATURATION: 97 %

## 2022-12-16 PROCEDURE — 99214 OFFICE O/P EST MOD 30 MIN: CPT

## 2022-12-16 RX ORDER — LOSARTAN POTASSIUM 100 MG/1
TABLET, FILM COATED ORAL
Refills: 0 | Status: ACTIVE | COMMUNITY

## 2022-12-16 RX ORDER — HYDROCHLOROTHIAZIDE 12.5 MG/1
TABLET ORAL
Refills: 0 | Status: ACTIVE | COMMUNITY

## 2022-12-16 NOTE — HISTORY OF PRESENT ILLNESS
[de-identified] : Kathy Wray 66 y/o F w/ hx of HTN, HLD, obesity here for elevated liver enzymes.\par \par -12/16/22: Last was seen by Dr. Bereket Milan on 4/4/22 with BW that day showing normal liver chemistries. She recently had BW with her PCP on 11/8/22 ALT 65, AST 50 with normal A1c and lipids. She is feeling well without any complaints. She had covid infection in Sept 2022. No new meds/supplements. She is currently taking losartan, HCTZ, rosuvastatin (started Jan 2022), MVT, Vit B12, Vit C, Vit D and Turmeric (started 1-2 yrs ago). Does not drink alcohol. \par \par Pertinent hx:\par Initially referred to us in 2012 for a history of abnormal liver tests. She has been evaluated for abnormal liver tests in 2009 with a nondiagnostic workup. A liver biopsy performed in 2009 revealed normal architecture with mild congestion. She had blood tests done on February 21, 2013 which revealed an ALT of 83 and an AST of 55. She reports being on intermittent antibiotics for urinary tract infection from September through December 2013. Her repeat labs from 6/28/2013 show an ALT 19, AST 20\par 3/2021 BW - AST 64, , . IgG 1796., IgA 90, IgM 83.\par 4/27/21 BW - , AST 34, ALT 45\par 6/1/21 visit - taking losartan 50 mg daily, HCTZ 12.5 mg daily. no herbal or dietary supplements. No alcohol. 13 lbs of weight gain since 2013 (last seen) BW - AST 38, ALT 45\par 7/7/21 visit - Fibroscan , 3.9 kPa. C/w S0, F0.\par 4/4/22 visit - doing well. on rosuvastatin for the past 4 months. no recent labs available.

## 2022-12-16 NOTE — PHYSICAL EXAM
[General Appearance - Alert] : alert [General Appearance - In No Acute Distress] : in no acute distress [Neck Appearance] : the appearance of the neck was normal [Neck Cervical Mass (___cm)] : no neck mass was observed [Jugular Venous Distention Increased] : there was no jugular-venous distention [Thyroid Diffuse Enlargement] : the thyroid was not enlarged [Thyroid Nodule] : there were no palpable thyroid nodules [Auscultation Breath Sounds / Voice Sounds] : lungs were clear to auscultation bilaterally [Heart Rate And Rhythm] : heart rate was normal and rhythm regular [Heart Sounds] : normal S1 and S2 [Heart Sounds Gallop] : no gallops [Murmurs] : no murmurs [Heart Sounds Pericardial Friction Rub] : no pericardial rub [Abdomen Soft] : soft [Bowel Sounds] : normal bowel sounds [Abdomen Tenderness] : non-tender [] : no hepato-splenomegaly [Abdomen Mass (___ Cm)] : no abdominal mass palpated [Oriented To Time, Place, And Person] : oriented to person, place, and time [Affect] : the affect was normal [Scleral Icterus] : No Scleral Icterus [Abdominal  Ascites] : no ascites [Asterixis] : no asterixis observed [Jaundice] : No jaundice [General Appearance - Well Nourished] : well nourished [Sclera] : the sclera and conjunctiva were normal [Edema] : there was no peripheral edema

## 2022-12-16 NOTE — ASSESSMENT
[FreeTextEntry1] : 66 y/o F w/ hx of HTN, HLD, obesity here for elevated liver enzymes that appears to be fluctuating and self limiting since 2009. \par \par Previously thought it was due to drug induced liver injury back in 2013 from NSAIDS. Fibroscan from 7/7/21 now does not show any scarring or fat in the liver. Imaging in the past did not showed fatty liver. Extensive liver work-up to-date been unremarkable other than an elevated IgG level consistent with monoclonal gammopathy that is being monitored by Dr. Mckeon. Elevated liver enzymes of unclear cause but would not recommend any further invasive work-up for the time being given self improvement over the years. LFTs was normal in April 2022 and now mildly elevated again on BW from Nov.  Potential dili to rosuvastatin (started Jan 2022) or Turmeric (started 1-2 yrs ago). Will repeat BW today and and if LFTs remained elevated will have her stop tumeric and repeat BW in 2 months. \par \par # RHM\par needs repeat colonoscopy in 2024\par \par Plan:\par BW today and pt will call me to discuss result.

## 2022-12-17 LAB
INR PPP: 0.95 RATIO
PT BLD: 11.2 SEC

## 2022-12-19 LAB
ALBUMIN SERPL ELPH-MCNC: 4.8 G/DL
ALP BLD-CCNC: 96 U/L
ALT SERPL-CCNC: 39 U/L
ANION GAP SERPL CALC-SCNC: 18 MMOL/L
AST SERPL-CCNC: 38 U/L
BILIRUB SERPL-MCNC: 0.4 MG/DL
BUN SERPL-MCNC: 12 MG/DL
CALCIUM SERPL-MCNC: 9.7 MG/DL
CHLORIDE SERPL-SCNC: 101 MMOL/L
CO2 SERPL-SCNC: 22 MMOL/L
CREAT SERPL-MCNC: 0.64 MG/DL
EGFR: 98 ML/MIN/1.73M2
POTASSIUM SERPL-SCNC: 4 MMOL/L
PROT SERPL-MCNC: 8.1 G/DL
SODIUM SERPL-SCNC: 141 MMOL/L

## 2022-12-20 LAB
ANNOTATION COMMENT IMP: NOT DETECTED
HEPATITIS E QUANT BY PCR, IU/ML: NORMAL IU/ML
HEPATITIS E QUANT BY PCR, LOG IU/ML: <3.3 LOG IU/ML
HEPATITIS E QUANT BY PCR, SOURCE: NORMAL

## 2022-12-27 LAB — HEPATITIS E IGM ABY: NOT DETECTED

## 2023-02-14 ENCOUNTER — APPOINTMENT (OUTPATIENT)
Dept: ORTHOPEDIC SURGERY | Facility: CLINIC | Age: 68
End: 2023-02-14

## 2023-06-13 ENCOUNTER — NON-APPOINTMENT (OUTPATIENT)
Age: 68
End: 2023-06-13

## 2023-06-19 ENCOUNTER — APPOINTMENT (OUTPATIENT)
Dept: HEPATOLOGY | Facility: CLINIC | Age: 68
End: 2023-06-19
Payer: MEDICARE

## 2023-06-19 VITALS — WEIGHT: 192 LBS | BODY MASS INDEX: 32.96 KG/M2

## 2023-06-19 VITALS
HEIGHT: 64 IN | WEIGHT: 187 LBS | OXYGEN SATURATION: 99 % | SYSTOLIC BLOOD PRESSURE: 128 MMHG | DIASTOLIC BLOOD PRESSURE: 87 MMHG | HEART RATE: 80 BPM | BODY MASS INDEX: 31.92 KG/M2

## 2023-06-19 PROCEDURE — 99214 OFFICE O/P EST MOD 30 MIN: CPT

## 2023-06-19 RX ORDER — ESCITALOPRAM OXALATE 20 MG/1
TABLET ORAL
Refills: 0 | Status: ACTIVE | COMMUNITY

## 2023-06-19 NOTE — HISTORY OF PRESENT ILLNESS
[de-identified] : Kathy Wray 68 y/o F w/ hx of HTN, HLD, obesity with elevated liver enzymes here for follow up. \par \par -6/19/23 Feels well. Has anxiety and was started on Escitalopram about 1 month ago and feeling much better now. No recent BW and her last BW in Dec 2022 showed normal liver tests. \par \par -12/16/22: Last was seen by Dr. Bereket Milan on 4/4/22 with BW that day showing normal liver chemistries. She recently had BW with her PCP on 11/8/22 ALT 65, AST 50 with normal A1c and lipids. She is feeling well without any complaints. She had covid infection in Sept 2022. No new meds/supplements. She is currently taking losartan, HCTZ, rosuvastatin (started Jan 2022), MVT, Vit B12, Vit C, Vit D and Turmeric (started 1-2 yrs ago). Does not drink alcohol. \par \par Pertinent hx:\par Initially referred to us in 2012 for a history of abnormal liver tests. She has been evaluated for abnormal liver tests in 2009 with a nondiagnostic workup. A liver biopsy performed in 2009 revealed normal architecture with mild congestion. She had blood tests done on February 21, 2013 which revealed an ALT of 83 and an AST of 55. She reports being on intermittent antibiotics for urinary tract infection from September through December 2013. Her repeat labs from 6/28/2013 show an ALT 19, AST 20\par 3/2021 BW - AST 64, , . IgG 1796., IgA 90, IgM 83.\par 4/27/21 BW - , AST 34, ALT 45\par 6/1/21 visit - taking losartan 50 mg daily, HCTZ 12.5 mg daily. no herbal or dietary supplements. No alcohol. 13 lbs of weight gain since 2013 (last seen) BW - AST 38, ALT 45\par 7/7/21 visit - Fibroscan , 3.9 kPa. C/w S0, F0.\par 4/4/22 visit - doing well. on rosuvastatin for the past 4 months. no recent labs available.

## 2023-06-19 NOTE — ASSESSMENT
[FreeTextEntry1] : 68 y/o F w/ hx of HTN, HLD, obesity here for elevated liver enzymes that appears to be fluctuating and self limiting since 2009. \par \par Previously thought it was due to drug induced liver injury back in 2013 from NSAIDS. Fibroscan from 7/7/21 does not show any fibrosis or steatosis in the liver. Imaging in the past did not showed fatty liver. Extensive liver work-up to-date been unremarkable other than an elevated IgG level consistent with monoclonal gammopathy that is being monitored by Dr. Mckeon. Elevated liver enzymes of unclear cause but would not recommend any further invasive work-up for the time being given self improvement over the years. LFTs was normal in Dec 2022.  \par \par # RHM\par needs repeat colonoscopy in 2024\par \par Plan:\par BW today, fibroscan test and pt will call me to discuss result. If all normal, will see pt PRN.

## 2023-06-20 ENCOUNTER — NON-APPOINTMENT (OUTPATIENT)
Age: 68
End: 2023-06-20

## 2023-06-20 LAB
ALBUMIN SERPL ELPH-MCNC: 4.5 G/DL
ALP BLD-CCNC: 103 U/L
ALT SERPL-CCNC: 52 U/L
ANION GAP SERPL CALC-SCNC: 11 MMOL/L
AST SERPL-CCNC: 45 U/L
BILIRUB SERPL-MCNC: 0.4 MG/DL
BUN SERPL-MCNC: 15 MG/DL
CALCIUM SERPL-MCNC: 9.6 MG/DL
CHLORIDE SERPL-SCNC: 105 MMOL/L
CO2 SERPL-SCNC: 26 MMOL/L
CREAT SERPL-MCNC: 0.67 MG/DL
EGFR: 97 ML/MIN/1.73M2
POTASSIUM SERPL-SCNC: 4.2 MMOL/L
PROT SERPL-MCNC: 7.5 G/DL
SODIUM SERPL-SCNC: 142 MMOL/L

## 2023-07-07 ENCOUNTER — APPOINTMENT (OUTPATIENT)
Dept: HEPATOLOGY | Facility: CLINIC | Age: 68
End: 2023-07-07
Payer: MEDICARE

## 2023-07-07 PROCEDURE — 76981 USE PARENCHYMA: CPT | Mod: 26

## 2023-07-11 ENCOUNTER — NON-APPOINTMENT (OUTPATIENT)
Age: 68
End: 2023-07-11

## 2023-12-27 ENCOUNTER — APPOINTMENT (OUTPATIENT)
Dept: HEPATOLOGY | Facility: CLINIC | Age: 68
End: 2023-12-27

## 2024-03-21 ENCOUNTER — APPOINTMENT (OUTPATIENT)
Dept: HEPATOLOGY | Facility: CLINIC | Age: 69
End: 2024-03-21
Payer: MEDICARE

## 2024-03-21 VITALS
WEIGHT: 192 LBS | HEIGHT: 64 IN | TEMPERATURE: 97.1 F | HEART RATE: 83 BPM | OXYGEN SATURATION: 98 % | RESPIRATION RATE: 15 BRPM | BODY MASS INDEX: 32.78 KG/M2 | SYSTOLIC BLOOD PRESSURE: 134 MMHG | DIASTOLIC BLOOD PRESSURE: 83 MMHG

## 2024-03-21 DIAGNOSIS — R79.89 OTHER SPECIFIED ABNORMAL FINDINGS OF BLOOD CHEMISTRY: ICD-10-CM

## 2024-03-21 PROCEDURE — 99214 OFFICE O/P EST MOD 30 MIN: CPT

## 2024-03-21 NOTE — PHYSICAL EXAM
[General Appearance - Alert] : alert [General Appearance - In No Acute Distress] : in no acute distress [Sclera] : the sclera and conjunctiva were normal [Auscultation Breath Sounds / Voice Sounds] : lungs were clear to auscultation bilaterally [Heart Sounds] : normal S1 and S2 [Heart Rate And Rhythm] : heart rate was normal and rhythm regular [Bowel Sounds] : normal bowel sounds [Abdomen Soft] : soft [Abdomen Tenderness] : non-tender [Abnormal Walk] : normal gait [Nail Clubbing] : no clubbing  or cyanosis of the fingernails [Musculoskeletal - Swelling] : no joint swelling seen [Skin Color & Pigmentation] : normal skin color and pigmentation [Skin Turgor] : normal skin turgor [] : no rash [No Focal Deficits] : no focal deficits [Oriented To Time, Place, And Person] : oriented to person, place, and time [Impaired Insight] : insight and judgment were intact [Affect] : the affect was normal

## 2024-03-21 NOTE — ASSESSMENT
[FreeTextEntry1] : Kathy Wray 69 y/o lady w/ hx of HTN, HLD, obesity with elevated liver enzymes here for follow up.   Seen previously by Dr. Milan and JASMINA Real.   Liver enzymes have fluctuated through the years since 2012 initially thought due to DILI. A liver biopsy performed in 2009 revealed normal architecture with mild congestion. Now, presumed etiology is due to FATTY liver. Prior liver serology work up largely unrevealing.  Most recent labs completed with her PCP showed elevated enzymes per pt in Nov, 2023 6/2023 - fibroscan s0/f0 7/7/21-  Fibroscan , 3.9 kPa. C/w S0, F0.  WIll repeat labs today. We spent a considerable amount of time discussing the importance of lifestyle changes. Small meaningful changes that can have a good impact in her health. i congratulated her on her weight loss so far and encouraged her to continue. Labs today Fibroscan in the summer.

## 2024-03-21 NOTE — HISTORY OF PRESENT ILLNESS
[FreeTextEntry1] : Kathy Wray 67 y/o lady w/ hx of HTN, HLD, obesity with elevated liver enzymes here for follow up.   Seen previously by Dr. Milan and JASMINA Real.   She tells me that her enzymes have fluctuated through the years since 2012 initially thought due to DILI. A liver biopsy performed in 2009 revealed normal architecture with mild congestion.  Now presumed due to FATTY liver. Prior liver serology work up largely unrevealing.   At today's visit, she feels well and denies any complaints. Her enzymes were slightly elevated on pt's most recent labs with her PCP hence the return today. She gained a bit of weight ~11lbs late last year due to poor dietary choices and has now started to lose weight again. Has lost about 6lbs in the past 3-4 months with better dietary choices. She tries to do a long walk [place] the mall. No alcohol, no herbs or supplements.  6/2023 - fibroscan s0/f0 7/7/21-  Fibroscan , 3.9 kPa. C/w S0, F0.   Current Meds: Rosuvastatin 10 Escitalopram Losartan 50mg

## 2024-03-22 LAB
24R-OH-CALCIDIOL SERPL-MCNC: 56.8 PG/ML
ALBUMIN SERPL ELPH-MCNC: 4.6 G/DL
ALP BLD-CCNC: 81 U/L
ALT SERPL-CCNC: 30 U/L
ANION GAP SERPL CALC-SCNC: 12 MMOL/L
AST SERPL-CCNC: 31 U/L
BILIRUB SERPL-MCNC: 0.5 MG/DL
BUN SERPL-MCNC: 14 MG/DL
CALCIUM SERPL-MCNC: 9.8 MG/DL
CHLORIDE SERPL-SCNC: 103 MMOL/L
CO2 SERPL-SCNC: 27 MMOL/L
CREAT SERPL-MCNC: 0.62 MG/DL
EGFR: 98 ML/MIN/1.73M2
GGT SERPL-CCNC: 32 U/L
GLUCOSE SERPL-MCNC: 91 MG/DL
HCT VFR BLD CALC: 40.5 %
HGB BLD-MCNC: 12.7 G/DL
INR PPP: 0.95 RATIO
MCHC RBC-ENTMCNC: 30.4 PG
MCHC RBC-ENTMCNC: 31.4 GM/DL
MCV RBC AUTO: 96.9 FL
PLATELET # BLD AUTO: 189 K/UL
POTASSIUM SERPL-SCNC: 4.1 MMOL/L
PROT SERPL-MCNC: 7.7 G/DL
PT BLD: 10.7 SEC
RBC # BLD: 4.18 M/UL
RBC # FLD: 13.4 %
SODIUM SERPL-SCNC: 142 MMOL/L
WBC # FLD AUTO: 4.67 K/UL

## 2024-03-27 LAB — VIT A SERPL-MCNC: 36.1 UG/DL

## 2024-07-15 ENCOUNTER — APPOINTMENT (OUTPATIENT)
Dept: HEPATOLOGY | Facility: CLINIC | Age: 69
End: 2024-07-15
Payer: MEDICARE

## 2024-07-15 VITALS
DIASTOLIC BLOOD PRESSURE: 75 MMHG | HEART RATE: 84 BPM | HEIGHT: 64 IN | TEMPERATURE: 96.3 F | OXYGEN SATURATION: 97 % | RESPIRATION RATE: 15 BRPM | SYSTOLIC BLOOD PRESSURE: 113 MMHG | WEIGHT: 188 LBS | BODY MASS INDEX: 32.1 KG/M2

## 2024-07-15 DIAGNOSIS — R79.89 OTHER SPECIFIED ABNORMAL FINDINGS OF BLOOD CHEMISTRY: ICD-10-CM

## 2024-07-15 PROCEDURE — 99213 OFFICE O/P EST LOW 20 MIN: CPT
